# Patient Record
Sex: FEMALE | Race: BLACK OR AFRICAN AMERICAN | NOT HISPANIC OR LATINO | Employment: UNEMPLOYED | URBAN - METROPOLITAN AREA
[De-identification: names, ages, dates, MRNs, and addresses within clinical notes are randomized per-mention and may not be internally consistent; named-entity substitution may affect disease eponyms.]

---

## 2020-05-18 ENCOUNTER — OCCUPATIONAL HEALTH (OUTPATIENT)
Dept: URGENT CARE | Facility: CLINIC | Age: 30
End: 2020-05-18
Payer: MEDICAID

## 2020-05-18 DIAGNOSIS — Z02.1 PRE-EMPLOYMENT EXAMINATION: Primary | ICD-10-CM

## 2020-05-18 LAB
CTP QC/QA: YES
POC 10 PANEL DRUG SCREEN: NEGATIVE

## 2020-05-18 PROCEDURE — U0003 INFECTIOUS AGENT DETECTION BY NUCLEIC ACID (DNA OR RNA); SEVERE ACUTE RESPIRATORY SYNDROME CORONAVIRUS 2 (SARS-COV-2) (CORONAVIRUS DISEASE [COVID-19]), AMPLIFIED PROBE TECHNIQUE, MAKING USE OF HIGH THROUGHPUT TECHNOLOGIES AS DESCRIBED BY CMS-2020-01-R: HCPCS

## 2020-05-18 PROCEDURE — 80305 DRUG TEST PRSMV DIR OPT OBS: CPT | Mod: QW,S$GLB,, | Performed by: NURSE PRACTITIONER

## 2020-05-18 PROCEDURE — 99499 UNLISTED E&M SERVICE: CPT | Mod: S$GLB,,, | Performed by: NURSE PRACTITIONER

## 2020-05-18 PROCEDURE — 99499 PHYSICAL, BASIC COMPLEXITY: ICD-10-PCS | Mod: S$GLB,,, | Performed by: NURSE PRACTITIONER

## 2020-05-18 PROCEDURE — 80305 POCT RAPID DRUG SCREEN 10 PANEL: ICD-10-PCS | Mod: QW,S$GLB,, | Performed by: NURSE PRACTITIONER

## 2020-05-20 ENCOUNTER — TELEPHONE (OUTPATIENT)
Dept: URGENT CARE | Facility: CLINIC | Age: 30
End: 2020-05-20

## 2020-05-20 LAB — SARS-COV-2 RNA RESP QL NAA+PROBE: NOT DETECTED

## 2020-05-20 NOTE — TELEPHONE ENCOUNTER
Pt called regarding pre-employment physical follow up. Also notified of lab result (neg COVID). MWT

## 2020-08-04 ENCOUNTER — HOSPITAL ENCOUNTER (EMERGENCY)
Facility: HOSPITAL | Age: 30
Discharge: HOME | End: 2020-08-04
Payer: MEDICAID

## 2020-08-04 VITALS
TEMPERATURE: 99 F | RESPIRATION RATE: 20 BRPM | HEART RATE: 85 BPM | SYSTOLIC BLOOD PRESSURE: 117 MMHG | OXYGEN SATURATION: 98 % | DIASTOLIC BLOOD PRESSURE: 87 MMHG

## 2020-08-04 DIAGNOSIS — Z01.89 ENCOUNTER FOR LABORATORY TEST: Primary | ICD-10-CM

## 2020-08-04 DIAGNOSIS — Z20.822 COVID-19 VIRUS NOT DETECTED: ICD-10-CM

## 2020-08-04 LAB — SARS-COV-2 RDRP RESP QL NAA+PROBE: NEGATIVE

## 2020-08-04 PROCEDURE — 99282 EMERGENCY DEPT VISIT SF MDM: CPT

## 2020-08-04 PROCEDURE — U0002 COVID-19 LAB TEST NON-CDC: HCPCS

## 2020-08-04 NOTE — ED PROVIDER NOTES
HISTORY     Chief Complaint   Patient presents with    COVID-19 Concerns     New inmate here for COVID rule out. No symptoms, no distress          Review of patient's allergies indicates:  No Known Allergies     HPI   HPI     Pt here Covid-19 test prior to going to nursing home. Pt reports the following symptoms: none. Pt denies any of the following: CP, SOB, fever, NVD, abdominal pain or any other symptoms at this time.     PCP: Primary Doctor No     Past Medical History:  No past medical history on file.     Past Surgical History:  No past surgical history on file.     Family History:  No family history on file.     Social History:  Social History     Tobacco Use    Smoking status: Current Every Day Smoker     Packs/day: 2.00     Types: Cigarettes   Substance and Sexual Activity    Alcohol use: Not on file    Drug use: Not on file    Sexual activity: Not on file         ROS   Review of Systems   Constitutional: Negative for fever.   HENT: Negative for sore throat.    Respiratory: Negative for shortness of breath.    Cardiovascular: Negative for chest pain.   Gastrointestinal: Negative for nausea.   Genitourinary: Negative for dysuria.   Musculoskeletal: Negative for back pain.   Skin: Negative for rash.   Neurological: Negative for weakness.   Hematological: Does not bruise/bleed easily.   All other systems reviewed and are negative.      PHYSICAL EXAM     Initial Vitals [08/04/20 1546]   BP Pulse Resp Temp SpO2   117/87 85 20 98.8 °F (37.1 °C) 98 %      MAP       --           Physical Exam     Nursing Notes and Vital Signs Reviewed.  Constitutional: Patient is in no acute distress.   Pulmonary/Chest: No respiratory distress.   Musculoskeletal: Moves all extremities.   Neurological:  Alert, awake, and appropriate.  Normal speech.    Psychiatric: Normal affect. Good eye contact. Appropriate in content.      ED COURSE   Procedures  ED ONGOING VITALS:  Vitals:    08/04/20 1546   BP: 117/87   Pulse: 85   Resp: 20    Temp: 98.8 °F (37.1 °C)   SpO2: 98%         ABNORMAL LAB VALUES:  Labs Reviewed   SARS-COV-2 RNA AMPLIFICATION, QUAL         ALL LAB VALUES:        RADIOLOGY STUDIES:  Imaging Results    None                   The above vital signs and test results have been reviewed by the emergency provider.     ED Medications:  There are no discharge medications for this patient.    Discharge Medications:  There are no discharge medications for this patient.     Follow-up Information     [facility]    Specialty: Emergency Medicine  Why: As needed  Contact information:  [id number] [place] [id number] [phone]                  I discussed with patient and/or family/caretaker that evaluation in the ED does not suggest any emergent or life threatening medical conditions requiring immediate intervention beyond what was provided in the ED, and I believe patient is safe for discharge. Regardless, an unremarkable evaluation in the ED does not preclude the development or presence of a serious or life threatening condition. As such, patient was instructed to return immediately for any worsening or change in current symptoms.        MEDICAL DECISION MAKING                 CLINICAL IMPRESSION       ICD-10-CM ICD-9-CM   1. Encounter for laboratory test  Z01.89 V72.60   2. Covid-19 Virus not Detected  [id number]                Yogesh Coy Jr., FNP  08/04/20 1731

## 2021-08-19 ENCOUNTER — HOSPITAL ENCOUNTER (EMERGENCY)
Facility: HOSPITAL | Age: 31
Discharge: HOME | End: 2021-08-19
Attending: EMERGENCY MEDICINE
Payer: COMMERCIAL

## 2021-08-19 VITALS
HEART RATE: 64 BPM | DIASTOLIC BLOOD PRESSURE: 72 MMHG | SYSTOLIC BLOOD PRESSURE: 112 MMHG | BODY MASS INDEX: 23.22 KG/M2 | HEIGHT: 64 IN | WEIGHT: 136 LBS | OXYGEN SATURATION: 100 % | TEMPERATURE: 96 F | RESPIRATION RATE: 16 BRPM

## 2021-08-19 DIAGNOSIS — Z20.822 COVID-19 VIRUS NOT DETECTED: Primary | ICD-10-CM

## 2021-08-19 LAB
CTP QC/QA: YES
SARS-COV-2 RDRP RESP QL NAA+PROBE: NEGATIVE

## 2021-08-19 PROCEDURE — U0002 COVID-19 LAB TEST NON-CDC: HCPCS | Performed by: REGISTERED NURSE

## 2021-08-19 PROCEDURE — 99282 EMERGENCY DEPT VISIT SF MDM: CPT
